# Patient Record
Sex: MALE | Race: WHITE | NOT HISPANIC OR LATINO | Employment: FULL TIME | URBAN - METROPOLITAN AREA
[De-identification: names, ages, dates, MRNs, and addresses within clinical notes are randomized per-mention and may not be internally consistent; named-entity substitution may affect disease eponyms.]

---

## 2021-08-16 ENCOUNTER — IMMUNIZATION (OUTPATIENT)
Dept: PRIMARY CARE CLINIC | Facility: CLINIC | Age: 51
End: 2021-08-16

## 2021-08-16 DIAGNOSIS — Z23 NEED FOR VACCINATION: Primary | ICD-10-CM

## 2021-08-16 PROCEDURE — 0001A COVID-19, MRNA, LNP-S, PF, 30 MCG/0.3 ML DOSE VACCINE: ICD-10-PCS | Mod: CV19,S$GLB,, | Performed by: INTERNAL MEDICINE

## 2021-08-16 PROCEDURE — 91300 COVID-19, MRNA, LNP-S, PF, 30 MCG/0.3 ML DOSE VACCINE: CPT | Mod: S$GLB,,, | Performed by: INTERNAL MEDICINE

## 2021-08-16 PROCEDURE — 91300 COVID-19, MRNA, LNP-S, PF, 30 MCG/0.3 ML DOSE VACCINE: ICD-10-PCS | Mod: S$GLB,,, | Performed by: INTERNAL MEDICINE

## 2021-08-16 PROCEDURE — 0001A COVID-19, MRNA, LNP-S, PF, 30 MCG/0.3 ML DOSE VACCINE: CPT | Mod: CV19,S$GLB,, | Performed by: INTERNAL MEDICINE

## 2021-09-22 ENCOUNTER — IMMUNIZATION (OUTPATIENT)
Dept: PRIMARY CARE CLINIC | Facility: CLINIC | Age: 51
End: 2021-09-22

## 2021-09-22 DIAGNOSIS — Z23 NEED FOR VACCINATION: Primary | ICD-10-CM

## 2021-09-22 PROCEDURE — 91300 COVID-19, MRNA, LNP-S, PF, 30 MCG/0.3 ML DOSE VACCINE: CPT | Mod: PBBFAC | Performed by: INTERNAL MEDICINE

## 2021-09-22 PROCEDURE — 0002A COVID-19, MRNA, LNP-S, PF, 30 MCG/0.3 ML DOSE VACCINE: CPT | Mod: CV19,PBBFAC | Performed by: INTERNAL MEDICINE

## 2025-01-20 LAB — POCT GLUCOSE: 129 MG/DL (ref 70–110)

## 2025-01-20 PROCEDURE — 99282 EMERGENCY DEPT VISIT SF MDM: CPT

## 2025-01-20 PROCEDURE — 82962 GLUCOSE BLOOD TEST: CPT

## 2025-01-21 ENCOUNTER — HOSPITAL ENCOUNTER (EMERGENCY)
Facility: HOSPITAL | Age: 55
Discharge: HOME | End: 2025-01-21
Attending: STUDENT IN AN ORGANIZED HEALTH CARE EDUCATION/TRAINING PROGRAM

## 2025-01-21 VITALS
WEIGHT: 163.81 LBS | BODY MASS INDEX: 27.29 KG/M2 | SYSTOLIC BLOOD PRESSURE: 165 MMHG | RESPIRATION RATE: 20 BRPM | OXYGEN SATURATION: 98 % | HEART RATE: 76 BPM | DIASTOLIC BLOOD PRESSURE: 79 MMHG | TEMPERATURE: 99 F | HEIGHT: 65 IN

## 2025-01-21 DIAGNOSIS — R03.0 ELEVATED BLOOD PRESSURE READING WITHOUT DIAGNOSIS OF HYPERTENSION: ICD-10-CM

## 2025-01-21 DIAGNOSIS — N48.89 PENILE SWELLING: Primary | ICD-10-CM

## 2025-01-21 LAB
BILIRUB UR QL STRIP: NEGATIVE
CLARITY UR: CLEAR
COLOR UR: COLORLESS
GLUCOSE UR QL STRIP: NEGATIVE
HGB UR QL STRIP: NEGATIVE
KETONES UR QL STRIP: NEGATIVE
LEUKOCYTE ESTERASE UR QL STRIP: NEGATIVE
NITRITE UR QL STRIP: NEGATIVE
PH UR STRIP: 7 [PH] (ref 5–8)
PROT UR QL STRIP: NEGATIVE
SP GR UR STRIP: 1.01 (ref 1–1.03)
URN SPEC COLLECT METH UR: ABNORMAL
UROBILINOGEN UR STRIP-ACNC: NEGATIVE EU/DL

## 2025-01-21 PROCEDURE — 87591 N.GONORRHOEAE DNA AMP PROB: CPT | Performed by: PHYSICIAN ASSISTANT

## 2025-01-21 PROCEDURE — 81003 URINALYSIS AUTO W/O SCOPE: CPT | Performed by: PHYSICIAN ASSISTANT

## 2025-01-21 NOTE — ED PROVIDER NOTES
Encounter Date: 1/20/2025       History     Chief Complaint   Patient presents with    Male  Problem     Pt c/o swollen area on penis x2 days; pt denies pain, dysuria, difficulty urinating, penile discharge, or hx of this problem; pt reports unprotected sex approx 2 weeks ago     53yo M presents to ED with chief complaint atraumatic penile swelling x 2d.    Admits to swelling to foreskin and shaft of penis beginning yesterday afternoon.  No hx similar rash, denies history of similar symptoms. Denies pain. No open wounds. Denies tenderness.  Denies scrotal or testicular involvement.  Denies pruritus.  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 Unprotected sexual intercourse 2-3 weeks ago, but states not a new partner.  No penile discharge.  No hematuria.  Denies dysuria, frequency, urgency, difficulty voiding, or any other urinary complaints.  Denies polyuria.  Denies history of DM.  No abdominal pain.  No nausea vomiting.  No flu-like illness.  No fever chills myalgias.  Applied alcohol to the area pta, no improvement.  Denies any other areas of swelling.  No history of CHF.    Denies significant pmh  No daily rx      Review of patient's allergies indicates:  No Known Allergies  No past medical history on file.  No past surgical history on file.  No family history on file.     Review of Systems   Constitutional:  Negative for chills and fever.   Gastrointestinal:  Negative for abdominal pain, nausea and vomiting.   Endocrine: Negative for polyuria.   Genitourinary:  Positive for penile swelling. Negative for dysuria, flank pain, frequency, hematuria, penile discharge, penile pain, scrotal swelling, testicular pain and urgency.   Musculoskeletal:  Negative for myalgias.   Skin:  Positive for rash.   Neurological:  Negative for syncope.       Physical Exam     Initial Vitals [01/20/25 2352]   BP Pulse Resp Temp SpO2   (!) 178/108 70 18 98.6 °F (37 °C) 99 %      MAP        --         Physical Exam    Nursing note and vitals reviewed.  Constitutional: He appears well-developed and well-nourished. He is not diaphoretic. No distress.   Well-appearing, nontoxic.   HENT:   Head: Normocephalic and atraumatic.   Neck: Neck supple.   Normal range of motion.  Cardiovascular:  Normal rate.           No lower extremity edema   Pulmonary/Chest: No respiratory distress.   Abdominal: Abdomen is soft. There is no abdominal tenderness.   Genitourinary:    Genitourinary Comments: Circumcised. Mild circumferential edema to midshaft, moderate edema to distal dependent shaft/foreskin. No glans edema. No phimosis. No ttp. No open wounds. No erythema. There are very fine, papular, skin-colored, grouped lesions to the distal shaft--no vesicular lesions, no ttp.      No scrotal edema or erythema, no testicular tenderness. No tender inguinal lymphadenopathy.     Musculoskeletal:      Cervical back: Normal range of motion and neck supple.     Neurological: He is alert and oriented to person, place, and time.   Skin: Skin is warm.   Psychiatric: Thought content normal.   Flat affect         ED Course   Procedures  Labs Reviewed   URINALYSIS, REFLEX TO URINE CULTURE - Abnormal       Result Value    Specimen UA Urine, Clean Catch      Color, UA Colorless (*)     Appearance, UA Clear      pH, UA 7.0      Specific Gravity, UA 1.010      Protein, UA Negative      Glucose, UA Negative      Ketones, UA Negative      Bilirubin (UA) Negative      Occult Blood UA Negative      Nitrite, UA Negative      Urobilinogen, UA Negative      Leukocytes, UA Negative      Narrative:     Specimen Source->Urine   POCT GLUCOSE - Abnormal    POCT Glucose 129 (*)    C. TRACHOMATIS/N. GONORRHOEAE BY AMP DNA   POCT GLUCOSE MONITORING CONTINUOUS          Imaging Results    None          Medications - No data to display  Medical Decision Making  Differential diagnosis:  Balanitis, new onset DM, cellulitis, tinea cruris, genital herpes,  UTI, STI, phimosis, paraphimosis    Amount and/or Complexity of Data Reviewed  Labs: ordered. Decision-making details documented in ED Course.  Discussion of management or test interpretation with external provider(s): Question genital herpes, but there is no pain or ttp--very fine grouped papular lesions, but no vesicular appearance. No open wounds. No urinary complaints. No penile d/c. No suspicion for STI. Normal glucose. No trauma. Moderate edema to dependent foreskin, but able to pull foreskin over glans. Applied mild compression to penis, as well as elevated and placed ice pack, will reevaluate after a few minutes.    No trauma. No abdominal or pelvic pain. No scrotal complaints. No urinary complaints. No evidence of UTI. No hx similar symptoms. No hx DM, normal glucose. No convincing acute rash, although fine papular lesions--not consistent with genital herpes appearance. Unsure if another type of dermatitis. No pruritus.     Some improvement in edema following compression, application of ice---has become softer and improvement in appearance of swelling.     Discussed need for return if he begins with difficulty voiding, he begins with penile pain, discussed paraphimosis---importance of being able to pull the foreskin over the glans of the penis.  Referral placed to establish care with a local PCP.  Referral placed to establish care with a local urologist.  Patient will continue with elevation, ice.  Patient comfortable with current plan.                                      Clinical Impression:  Final diagnoses:  [R03.0] Elevated blood pressure reading without diagnosis of hypertension  [N48.89] Penile swelling (Primary)          ED Disposition Condition    Discharge Stable          ED Prescriptions    None       Follow-up Information       Follow up With Specialties Details Why Contact [facility] -  Schedule an appointment as soon as possible for a visit  To establish primary care  physician, for reevaluation [address]  [place] [address]  [phone]      [facility], [place]  Schedule an appointment as soon as possible for a visit  To establish primary care physician, For reevaluation [address] [facility] [address]  [phone]      [facility] Schedule an appointment as soon as possible for a visit  To establish primary care physician, For reevaluation [address] [facility] [address]  [phone]               Trevon Llamas, PA-C  01/21/25 0052

## 2025-01-21 NOTE — DISCHARGE INSTRUCTIONS
Por favor, lilo un seguimiento y establezca loree valerie con un médico de cabecera local para reevaluar la presión arterial elevada incidental. Por favor, lilo un seguimiento y establezca loree valerie con un urólogo local para reevaluar la hinchazón del pene, especialmente si hay alguna hinchazón persistente. Continúe con la elevación del pene, compresas de hielo y loree compresión suave para evitar que empeore la hinchazón. Por favor, acuda a kerri servicio de urgencias de manera urgente si comienza a tener dificultad para orinar, dolor pélvico o abdominal, dolor intenso en el pene, si no puede colocar el prepucio sobre el pene o si el prepucio se queda atascado sobre la jerri del pene, si la hinchazón empeora a pesar del plan actual, si ocurre cualquier otro problema.    Please follow up and establish care with a local primary care provider for re-evaluation of incidental elevated blood pressure.  Please follow up and establish care with a local urologist, for re-evaluation of penile swelling, especially if any persistent swelling.  Continue with elevation of the penis, ice packs, gentle compression to prevent worsening swelling.  Please presents to this ED emergently if you begin with difficulty urinating, pelvic pain or abdominal pain, severe penile pain, if unable to pull the foreskin over your penis or if the foreskin becomes stuck over the head of the penis, if worsening swelling despite current plan, if any other problems occur.

## 2025-01-24 LAB
C TRACH DNA SPEC QL NAA+PROBE: NOT DETECTED
N GONORRHOEA DNA SPEC QL NAA+PROBE: NOT DETECTED